# Patient Record
Sex: MALE | Race: WHITE | NOT HISPANIC OR LATINO | Employment: OTHER | URBAN - METROPOLITAN AREA
[De-identification: names, ages, dates, MRNs, and addresses within clinical notes are randomized per-mention and may not be internally consistent; named-entity substitution may affect disease eponyms.]

---

## 2017-07-26 ENCOUNTER — GENERIC CONVERSION - ENCOUNTER (OUTPATIENT)
Dept: OTHER | Facility: OTHER | Age: 82
End: 2017-07-26

## 2018-01-10 NOTE — MISCELLANEOUS
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)   11  Thrombocytopenia (287 5) (D69 6)    Plan  Anxiety    · ALPRAZolam 0 25 MG Oral Tablet; TAKE 1 TABLET TWICE DAILY AS NEEDED   Rx By: Caryl Cleaning; Dispense: 50 Days ; #:100 Tablet; Refill: 1; For: Anxiety; CHAU = Y; Call Rx; Last Updated By: Rogelio Clark; 7/22/2016 3:58:27 PM  Atrial fibrillation    · Warfarin Sodium 3 MG Oral Tablet (Coumadin); TAKE 1 TABLET AS DIRECTED   Rx By: Caryl Cleaning; Dispense: 0 Days ; #:90 Tablet; Refill: 1; For: Atrial fibrillation; CHAU = N; Verified Transmission to [id number] Last Updated By: System, [employer]; 7/22/2016 3:13:30 PM  Osteoarthritis    · TraMADol HCl  MG Oral Tablet Extended Release 24 Hour; TAKE 1 TABLET  DAILY   Rx By: Caryl Cleaning; Dispense: 90 Days ; #:90 Tablet Extended Release 24 Hour; Refill: 1; For: Osteoarthritis; CHAU = N; Print Rx    Discouraged use of bowel stimulants  Only to use Maalox and occasional Colace as needed  Follow-up with cardiology  Recheck in the office in 3 months  Called his any problems  Chief Complaint  Chief Complaint Free Text Note Form: [address]- REQUESTING REFILLS FOR ALPRAZOLAM AND COUMADIN      History of Present Illness  HPI: Patient is a [de-identified] male presented to the office after recent admission to the hospital for fatigue, gait dysfunction, deconditioning, and supratherapeutic level of his Coumadin  He is beginning physical therapy at home, made some progress with that   Cardiology said he may start to walk on a treadmill again  Patient had been taking meloxicam while on warfarin  Review of Systems  Complete-Male:   Constitutional: feeling tired, but No fever or chills, feels well, no tiredness, no recent weight gain or weight loss  Eyes: No complaints of eye pain, no red eyes, no discharge from eyes, no itchy eyes  ENT: no complaints of earache, no hearing loss, no nosebleeds, no nasal discharge, no sore throat, no hoarseness  Cardiovascular: No complaints of slow heart rate, no fast heart rate, no chest pain, no palpitations, no leg claudication, no lower extremity  Respiratory: No complaints of shortness of breath, no wheezing, no cough, no SOB on exertion, no orthopnea or PND  Gastrointestinal: No complaints of abdominal pain, no constipation, no nausea or vomiting, no diarrhea or bloody stools  Genitourinary: nocturia  Musculoskeletal: arthralgias, joint swelling, myalgias and joint stiffness  Integumentary: No complaints of skin rash or skin lesions, no itching, no skin wound, no dry skin  Neurological: tingling, dizziness, limb weakness, difficulty walking and Improved since he was doing physical therapy  Psychiatric: anxiety and Worse at nighttime, thinks too much before he goes to sleep  Endocrine: No complaints of proptosis, no hot flashes, no muscle weakness, no erectile dysfunction, no deepening of the voice, no feelings of weakness  Hematologic/Lymphatic: No complaints of swollen glands, no swollen glands in the neck, does not bleed easily, no easy bruising  Active Problems    1  Anxiety (300 00) (F41 9)   2  Atrial fibrillation (427 31) (I48 91)   3  Benign essential hypertension (401 1) (I10)   4  Chronic constipation (564 00) (K59 09)   5  Hyperlipidemia (272 4) (E78 5)   6  Lumbar back pain (724 2) (M54 5)   7  Mitral regurgitation (424 0) (I34 0)   8  Osteoarthritis (715 90) (M19 90)   9  Peripheral neuropathy (356 9) (G62 9)   10  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)   11  Thrombocytopenia (287 5) (D69 6)   12  Vertigo (780 4) (R42)    Past Medical History    1  History of Bright Red Blood Per Rectum   2  History of Colon cancer screening (V76 51) (Z12 11)   3  History of Diverticulosis (562 10) (K57 90)   4  History of Esophagitis, reflux (530 11) (K21 0)   5  History of actinic keratosis (V13 3) (Z87 2)   6  History of cataract (V12 49) (Z86 69)   7  History of diverticulitis of colon (V12 79) (Z87 19)   8  History of gynecomastia (V13 89) (Z87 898)   9  History of right bundle branch block (V15 1) (Z98 89)   10  History of sinus bradycardia (V12 59) (Z86 79)   11  History of upper respiratory infection (V12 09) (Z87 09)   12  History of Irregular heartbeat (427 9) (I49 9)   13  History of Low vitamin B12 level (266 2) (E53 8)   14  History of Pulmonary artery hypertension (416 8) (I27 2)   15  History of Special screening examination for neoplasm of prostate (V76 44) (Z12 5)   16  History of Strain Of Left Biceps Tendon (840 8)   17  History of Varicocele (456 4) (I86 1)    Surgical History    1  History of Arthroscopy Knee   2  History of Complete Colonoscopy   3  History of Hip Replacement   4  History of Knee Replacement   5  History of Pacemaker Placement   6  History of Pacemaker Placement  Surgical History Reviewed: The surgical history was reviewed and updated today  Family History  Mother    1  Family history of Heart Disease (V17 49)  Father    2  Family history of Cancer  Family History    3  Family history of Hodgkin Disease   4  Family history of Lymphoma (V16 7)  Family History Reviewed: The family history was reviewed and updated today  Social History    · Being A Social Drinker   · Caffeine Use   · Denied: History of Drug Use   · Education history   · Lives independently with spouse   · Marital history   · Never A Smoker   · Retired   · Uses Safety Equipment - [employer] History Reviewed: The social history was reviewed and updated today   The social history was reviewed and is unchanged  Current Meds   1  ALPRAZolam 0 25 MG Oral Tablet; TAKE 1 TABLET TWICE DAILY AS NEEDED; Therapy: 68EIR6411 to (Gillian Mcfarlane)  Requested for: 88HAK0212; Last   Rx:23Mar2016 Ordered   2  Lipitor 10 MG Oral Tablet; Therapy: 85TMF6537 to (Last Rx:26Jan2011)  Requested for: 26Jan2011 Ordered   3  Lisinopril 10 MG Oral Tablet; TAKE 1 TABLET DAILY AS DIRECTED; Therapy: (Recorded:05Nov2014) to Recorded   4  Metoprolol Succinate ER 25 MG Oral Tablet Extended Release 24 Hour; Therapy: 16PSW4867 to (Last Rx:60Clt6141)  Requested for: 89Ong1333 Ordered   5  Polyethylene Glycol 3350 Oral Powder; MIX 1 CAPFUL (17GM) IN 8 OUNCES OF WATER,   JUICE, OR TEA AND DRINK DAILY; Therapy: 12LQL7620 to (Evaluate:15Gfj0639)  Requested for: 58RFP6574; Last   Rx:22Jun2016 Ordered   6  Warfarin Sodium 5 MG Oral Tablet; Therapy: 61JYJ5990 to (Last Rx:26Jan2011)  Requested for: 51EIP2412 Ordered  Medication List Reviewed: The medication list was reviewed and updated today  Allergies    1  No Known Drug Allergies    Vitals  Signs   Recorded: 29LTS2247 14:24NL   Systolic: 982  Diastolic: 64  Temperature: 97 4 F  Height: 5 ft 11 in  Weight: 135 lb   BMI Calculated: 18 83  BSA Calculated: 1 78    Physical Exam    Constitutional   General appearance: No acute distress, well appearing and well nourished  Eyes   Conjunctiva and lids: No swelling, erythema, or discharge  Pupils and irises: Equal, round and reactive to light  Ears, Nose, Mouth, and Throat   External inspection of ears and nose: Normal     Otoscopic examination: Tympanic membrance translucent with normal light reflex  Canals patent without erythema  Nasal mucosa, septum, and turbinates: Normal without edema or erythema  Oropharynx: Normal with no erythema, edema, exudate or lesions  Pulmonary   Respiratory effort: No increased work of breathing or signs of respiratory distress      Auscultation of lungs: Clear to auscultation, equal breath sounds bilaterally, no wheezes, no rales, no rhonci  Cardiovascular   Palpation of heart: Normal PMI, no thrills  Auscultation of heart: Normal rate and rhythm, normal S1 and S2, without murmurs  Examination of extremities for edema and/or varicosities: Normal     Carotid pulses: Normal     Abdomen   Abdomen: Non-tender, no masses  Liver and spleen: No hepatomegaly or splenomegaly  Lymphatic   Palpation of lymph nodes in neck: No lymphadenopathy  Musculoskeletal   Gait and station: Abnormal   Gait evaluation demonstrated a wide-based and ataxic gait, stooping, shuffling and Uses a walker  Digits and nails: Normal without clubbing or cyanosis  Inspection/palpation of joints, bones, and muscles: Abnormal   Arthritic changes of the hands, knees, generalized tenderness of the lumbar spine  Skin   Skin and subcutaneous tissue: Normal without rashes or lesions  Neurologic   Cranial nerves: Cranial nerves 2-12 intact  Reflexes: 2+ and symmetric  Sensation: No sensory loss  Psychiatric   Orientation to person, place and time: Normal     Mood and affect: Abnormal   Mood and Affect: agitated, concerned, elevated and expansive  Results/Data  Reviewed visiting nurse notes, discharge summary, Hospital lab and consults          Signatures   Electronically signed by : Kenyatta Liu DO; Jul 22 2016  9:30PM EST                       (Author)

## 2018-01-15 NOTE — MISCELLANEOUS
Chief Complaint  Chief Complaint Free Text Note Form: here today for follow up from hospital- requesting refills for alprazolam and coumadin for 90 days      History of Present Illness  TCM Communication [facility]: The patient is being contacted for follow-up after hospitalization  Hospital records were reviewed  He was hospitalized at [facility]  The date of admission: 07/08/2016, date of discharge: 07/12/2016  Diagnosis: weakness, gait dysfunction  He was discharged to home  Medications were not reviewed today  He scheduled a follow up appointment  Symptoms: fatigue  Counseling was provided to the patient  Communication performed and completed by Liliam Andrade      Active Problems    1  Mitral regurgitation (424 0) (I34 0)  2  Vertigo (780 4) (R42)    Benign essential hypertension (401 1) (I10)       Osteoarthritis (715 90) (M19 90)       Thrombocytopenia (287 5) (D69 6)       Anxiety (300 00) (F41 9)       Atrial fibrillation (427 31) (I48 91)       Hyperlipidemia (272 4) (E78 5)       Peripheral neuropathy (356 9) (G62 9)       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)       Lumbar back pain (724 2) (M54 5)       Chronic constipation (564 00) (K59 09)          Past Medical History   1  History of Bright Red Blood Per Rectum  2  History of Colon cancer screening (V76 51) (Z12 11)  3  History of Diverticulosis (562 10) (K57 90)  4  History of Esophagitis, reflux (530 11) (K21 0)  5  History of actinic keratosis (V13 3) (Z87 2)  6  History of cataract (V12 49) (Z86 69)  7  History of diverticulitis of colon (V12 79) (Z87 19)  8  History of gynecomastia (V13 89) (Z87 898)  9  History of right bundle branch block (V15 1) (Z98 89)  10  History of sinus bradycardia (V12 59) (Z86 79)  11  History of upper respiratory infection (V12 09) (Z87 09)  12  History of Irregular heartbeat (427 9) (I49 9)  13  History of Low vitamin B12 level (266 2) (E53 8)  14  History of Pulmonary artery hypertension (416 8) (I27 2)  15  History of Special screening examination for neoplasm of prostate (V76 44) (Z12 5)  16  History of Strain Of Left Biceps Tendon (840 8)  17  History of Varicocele (456 4) (I86 1)    Surgical History   1  History of Arthroscopy Knee  2  History of Complete Colonoscopy  3  History of Hip Replacement  4  History of Knee Replacement  5  History of Pacemaker Placement  6  History of Pacemaker Placement    Family History  Mother   1  Family history of Heart Disease (V17 49)  Father   2  Family history of Cancer  Family History   3  Family history of Hodgkin Disease  4  Family history of Lymphoma (V16 7)    Social History    · Being A Social Drinker   · Caffeine Use   · Denied: History of Drug Use   · Education history   · Lives independently with spouse   · Marital history   · Never A Smoker   · Retired   · Uses Safety Equipment - Seatbelts    Current Meds  1  ALPRAZolam 0 25 MG Oral Tablet; TAKE 1 TABLET TWICE DAILY AS NEEDED; Therapy: 20ITF2401 to (Ora Gan)  Requested for: 84BWY5788; Last   Rx:23Mar2016 Ordered  2  Cyclobenzaprine HCl - 5 MG Oral Tablet; Take one tablet three times daily as needed for   pain; Therapy: 40KPP8167 to (Last Rx:17Jun2016)  Requested for: 47OYE1752 Ordered  3  Diclofenac Sodium 1 5 % Transdermal Solution; apply 5 drops each side of knee four   times a day; Therapy: 17UIN5843 to ([id number])  Requested for: 06Apr2015; Last   Rx:06Apr2015 Ordered  4  Lipitor 10 MG Oral Tablet; Therapy: 18APR9394 to (Last Rx:26Jan2011)  Requested for: 66XLR2984 Ordered  5  Lisinopril 10 MG Oral Tablet; TAKE 1 TABLET DAILY AS DIRECTED; Therapy: (Recorded:05Nov2014) to Recorded  6  Meclizine HCl - 25 MG Oral Tablet; TAKE 1 TABLET 4 times daily PRN dizziness; Therapy: 45ZSQ8238 to (Evaluate:15Ldw6446)  Requested for: 92Baj9929; Last   Rx:26Udk6339 Ordered  7  Meloxicam 7 5 MG Oral Tablet; take 1 tablet twice a day as needed;    Therapy: 54RHT9994 to (Last Rx:15Jun2016)  Requested for: 05YYU3412 Ordered  8  Metoprolol Succinate ER 25 MG Oral Tablet Extended Release 24 Hour; Therapy: 35FNA7420 to (Last Rx:83Spl3788)  Requested for: 62Xvf3582 Ordered  9  Polyethylene Glycol 3350 Oral Powder; MIX 1 CAPFUL (17GM) IN 8 OUNCES OF WATER,   JUICE, OR TEA AND DRINK DAILY; Therapy: 34XBL8090 to (Evaluate:45Tlq4147)  Requested for: 80SXA5741; Last   Rx:22Jun2016 Ordered  10  Warfarin Sodium 5 MG Oral Tablet; Therapy: 78CLH3972 to (Last Rx:26Jan2011)  Requested for: 91IOZ3309 Ordered  11  Warfarin Sodium 7 5 MG Oral Tablet; Therapy: 21Apr2014 to Recorded    Allergies   1   No Known Drug Allergies    Signatures   Electronically signed by : Vasu Alvarado DO; Jul 24 2016  9:42PM EST                       (Author)    Electronically signed by : Vasu Alvarado DO; Jul 24 2016  9:42PM EST                       (Author)